# Patient Record
Sex: FEMALE | Race: WHITE | NOT HISPANIC OR LATINO | URBAN - METROPOLITAN AREA
[De-identification: names, ages, dates, MRNs, and addresses within clinical notes are randomized per-mention and may not be internally consistent; named-entity substitution may affect disease eponyms.]

---

## 2023-03-16 LAB
ERYTHROCYTE DISTRIBUTION WIDTH (RATIO) BY AUTOMATED COUNT: 17.7 % (ref 11.5–14.5)
ERYTHROCYTE MEAN CORPUSCULAR HEMOGLOBIN CONCENTRATION (G/DL) BY AUTOMATED: 30.1 G/DL (ref 32–36)
ERYTHROCYTE MEAN CORPUSCULAR VOLUME (FL) BY AUTOMATED COUNT: 81 FL (ref 80–100)
ERYTHROCYTES (10*6/UL) IN BLOOD BY AUTOMATED COUNT: 4.11 X10E12/L (ref 4–5.2)
HEMATOCRIT (%) IN BLOOD BY AUTOMATED COUNT: 33.2 % (ref 36–46)
HEMOGLOBIN (G/DL) IN BLOOD: 10 G/DL (ref 12–16)
LEUKOCYTES (10*3/UL) IN BLOOD BY AUTOMATED COUNT: 7.8 X10E9/L (ref 4.4–11.3)
NRBC (PER 100 WBCS) BY AUTOMATED COUNT: 0 /100 WBC (ref 0–0)
PLATELETS (10*3/UL) IN BLOOD AUTOMATED COUNT: 362 X10E9/L (ref 150–450)

## 2023-05-30 ENCOUNTER — HOSPITAL ENCOUNTER (OUTPATIENT)
Dept: DATA CONVERSION | Facility: HOSPITAL | Age: 38
End: 2023-05-30
Attending: OBSTETRICS & GYNECOLOGY | Admitting: OBSTETRICS & GYNECOLOGY

## 2023-05-30 DIAGNOSIS — N94.6 DYSMENORRHEA, UNSPECIFIED: ICD-10-CM

## 2023-05-30 DIAGNOSIS — D21.9 BENIGN NEOPLASM OF CONNECTIVE AND OTHER SOFT TISSUE, UNSPECIFIED: ICD-10-CM

## 2023-05-30 DIAGNOSIS — N83.8 OTHER NONINFLAMMATORY DISORDERS OF OVARY, FALLOPIAN TUBE AND BROAD LIGAMENT: ICD-10-CM

## 2023-05-30 DIAGNOSIS — E66.01 MORBID (SEVERE) OBESITY DUE TO EXCESS CALORIES (MULTI): ICD-10-CM

## 2023-05-30 DIAGNOSIS — D64.9 ANEMIA, UNSPECIFIED: ICD-10-CM

## 2023-05-30 DIAGNOSIS — N72 INFLAMMATORY DISEASE OF CERVIX UTERI: ICD-10-CM

## 2023-05-30 DIAGNOSIS — F12.10 CANNABIS ABUSE, UNCOMPLICATED: ICD-10-CM

## 2023-05-30 DIAGNOSIS — F17.200 NICOTINE DEPENDENCE, UNSPECIFIED, UNCOMPLICATED: ICD-10-CM

## 2023-05-30 DIAGNOSIS — N92.0 EXCESSIVE AND FREQUENT MENSTRUATION WITH REGULAR CYCLE: ICD-10-CM

## 2023-05-30 DIAGNOSIS — D25.9 LEIOMYOMA OF UTERUS, UNSPECIFIED: ICD-10-CM

## 2023-05-30 DIAGNOSIS — I10 ESSENTIAL (PRIMARY) HYPERTENSION: ICD-10-CM

## 2023-05-30 DIAGNOSIS — Z90.89 ACQUIRED ABSENCE OF OTHER ORGANS: ICD-10-CM

## 2023-05-30 LAB
ABO GROUP (TYPE) IN BLOOD: NORMAL
ANION GAP IN SER/PLAS: 11 MMOL/L (ref 10–20)
ANTIBODY SCREEN: NORMAL
ATRIAL RATE: 68 BPM
CARBON DIOXIDE, TOTAL (MMOL/L) IN SER/PLAS: 24 MMOL/L (ref 21–32)
CHLORIDE (MMOL/L) IN SER/PLAS: 106 MMOL/L (ref 98–107)
ERYTHROCYTE DISTRIBUTION WIDTH (RATIO) BY AUTOMATED COUNT: 17 % (ref 11.5–14.5)
ERYTHROCYTE MEAN CORPUSCULAR HEMOGLOBIN CONCENTRATION (G/DL) BY AUTOMATED: 31.8 G/DL (ref 32–36)
ERYTHROCYTE MEAN CORPUSCULAR VOLUME (FL) BY AUTOMATED COUNT: 86 FL (ref 80–100)
ERYTHROCYTES (10*6/UL) IN BLOOD BY AUTOMATED COUNT: 5.32 X10E12/L (ref 4–5.2)
HEMATOCRIT (%) IN BLOOD BY AUTOMATED COUNT: 45.9 % (ref 36–46)
HEMOGLOBIN (G/DL) IN BLOOD: 14.6 G/DL (ref 12–16)
LEUKOCYTES (10*3/UL) IN BLOOD BY AUTOMATED COUNT: 6.8 X10E9/L (ref 4.4–11.3)
P AXIS: 12 DEGREES
PLATELETS (10*3/UL) IN BLOOD AUTOMATED COUNT: 289 X10E9/L (ref 150–450)
POTASSIUM (MMOL/L) IN SER/PLAS: 3.8 MMOL/L (ref 3.5–5.3)
PR INTERVAL: 165 MS
Q ONSET: 254 MS
QRS COUNT: 11 BEATS
QRS DURATION: 88 MS
QT INTERVAL: 391 MS
QTC CALCULATION(BAZETT): 416 MS
QTC FREDERICIA: 407 MS
R AXIS: -9 DEGREES
RH FACTOR: NORMAL
SODIUM (MMOL/L) IN SER/PLAS: 137 MMOL/L (ref 136–145)
T AXIS: 11 DEGREES
T OFFSET: 449 MS
VENTRICULAR RATE: 68 BPM

## 2023-07-10 LAB
COMPLETE PATHOLOGY REPORT: NORMAL
CONVERTED CLINICAL DIAGNOSIS-HISTORY: NORMAL
CONVERTED FINAL DIAGNOSIS: NORMAL
CONVERTED FINAL REPORT PDF LINK TO COPY AND PASTE: NORMAL
CONVERTED GROSS DESCRIPTION: NORMAL

## 2023-09-07 VITALS
SYSTOLIC BLOOD PRESSURE: 156 MMHG | HEIGHT: 66 IN | RESPIRATION RATE: 14 BRPM | DIASTOLIC BLOOD PRESSURE: 95 MMHG | BODY MASS INDEX: 39.82 KG/M2 | HEART RATE: 72 BPM | TEMPERATURE: 97.7 F

## 2023-09-30 NOTE — H&P
History of Present Illness:   Pregnant/Lactating:  ·  Are You Pregnant no   ·  Are You Currently Breastfeeding no     History Present Illness:  Reason for surgery: 37-year-old with history of fibroids,  menorrhagia, and dysmenorrhea for a total laparoscopic hysterectomy with bilateral salpingectomy   HPI:    37-year-old with history of menorrhagia, dysmenorrhea, and uterine fibroids, and history of anemia for a total laparoscopic hysterectomy with bilateral salpingectomy    Allergies:        Allergies:  ·  No Known Allergies :     Home Medication Review:   Home Medications Reviewed: yes     Impression/Procedure:   ·  Impression and Planned Procedure: Menorrhagia, dysmenorrhea, and fibroids for a total laparoscopic hysterectomy with bilateral salpingectomy       ERAS (Enhanced Recovery After Surgery):  ·  ERAS Patient: yes   ·  CPM/PAT Utilization: yes   ·  Immunonutrition Recovery Drink Utilization: no   ·  Carbohydrate Supplement Drink Utilization: no       Vital Signs:  Temperature C: 36.5 degrees C   Temperature F: 97.7 degrees F   Heart Rate: 72 beats per minute   Respiratory Rate: 14 breath per minute   Blood Pressure Systolic: 156 mm/Hg   Blood Pressure Diastolic: 95 mm/Hg     Physical Exam by System:    Constitutional: Patient sitting comfortably in bed   Respiratory/Thorax: Clear to auscultation   Cardiovascular: Regular rate and rhythm     Consent:   COVID-19 Consent:  ·  COVID-19 Risk Consent Surgeon has reviewed key risks related to the risk of narciso COVID-19 and if they contract COVID-19 what the risks are.       Electronic Signatures:  Santhosh Carmona)  (Signed 30-May-2023 07:26)   Authored: History of Present Illness, Allergies, Home  Medication Review, Impression/Procedure, ERAS, Physical Exam, Consent, Note Completion      Last Updated: 30-May-2023 07:26 by Santhosh Carmona)

## 2023-10-02 NOTE — OP NOTE
PROCEDURE DETAILS    Preoperative Diagnosis:  Benign connective tissue neoplasm, D21.9  Excessive and frequent menstruation, N92.0  Dysmenorrhea    Postoperative Diagnosis:  Fibroids, menorrhagia, dysmenorrhea  Surgeon: Santhosh Carmona  Resident/Fellow/Other Assistant: Dr Winn    Procedure:  1.  Total laparoscopic hysterectomy, bilateral salpingectomy **DR WINN TO ASSIST**    Anesthesia: No anesthesiologist associated with this case  Estimated Blood Loss: 15 mL  Findings: Enlarged uterus with multiple fibroids, partial tubes present and removed, normal ovaries  Specimens(s) Collected: yes,  Uterus and both tubes   Urine Output: 200 mL        Operative Report:   37-year-old with history of menorrhagia, dysmenorrhea, and uterine fibroids.  Patient also with anemia on iron.  Patient admitted for a TLH with bilateral salpingectomy.    Patient was brought to the operating room where a huddle was performed. Patient was placed under general ET tube anesthesia. Patient was placed in the lithotomy position. Patient was prepped and draped in the usual fashion for a vaginal and abdominal  procedure. A timeout was performed. A weighted speculum was inserted in the vagina, Sheehan catheter was inserted and cervix was grasped with a toothed tenaculum. A Cora 2 manipulator is inserted with a 3.5 centimeter cone and a 8 centimeter tip. The intrauterine  balloon was instilled with saline and the vaginal occluder was instilled with air.  Attention was then turned abdominally where a 5 mm umbilical incision was incised in the skin and through this a Veress needle was inserted. CO2 was instilled into the  abdominal cavity. The Veress needle was removed and under direct visualization a trocar and sheath were inserted. A second 10 mm left lateral incision was cut sharply in the skin and a second trocar and sheath were inserted. A third 5 mm right lateral  incision was made in the skin and a third trocar and sheath was  inserted. The pelvis was inspected with the uterus enlarged with multiple fibroids.  Both ovaries appeared normal.  Remnants of both tubes were present and removed.  The upper abdomen appeared  normal..  Both ureters were noted to be peristalsing normally. The left mesosalpinx was incised excising the left tube, followed by the left ovarian ligament and round ligament  with the ligature device. The serosal reflection of the bladder on the uterus  was then incised. The bladder was pushed down off the lower uterine segment. The left uterine vessels were then cauterized and incised. The same procedure was performed on the right side incising the right mesosalpinx excising the right tube followed  by the right ovarian ligament and round ligament followed by the right uterine vessels. Using unipolar cautery the vaginal cuff was incised onto the Cora cup, this was performed anteriorly down along the left side posteriorly and up along the right side.  The uterus was then removed vaginally. The uterus was replaced into the vagina to maintain the pneumoperitoneum. Attention was then turned abdominally where a fourth 5 mm left lateral incision was sharply incised in the skin and a fourth trocar and sheath  was inserted. Hemostasis was obtained from the vaginal cuff with cautery. The vaginal cuff was then closed with two running V lock O absorbable suture. This was run from left to right and back to the left.  hemostasis was noted be good.  The ureters were  again noted be peristalsing normally. All instruments removed. Incisions were repaired with 4-0 Vicryl. All counts were correct at the end of the procedure. EBL was 15 mL's. Patient was taken recovery room in stable condition.    Dr. Winn assisted with the surgery due to the complexity of the case.  She inserted the laparoscope while I inserted the Cora vaginally.  She  managed the laparoscope, assisted with the dissection and manipulation of the uterine fibroids,  ligated the right ovarian ligament, round ligament, and uterine vessels, and incision of the vaginal cuff onto the Cora cup due to the uterine fibroids and  assisted with suturing.  Her assistance was necessary for completion of this case.                        Attestation:   Note Completion:  Attending Attestation I performed the procedure without a resident         Electronic Signatures:  Santhosh Carmona)  (Signed 30-May-2023 09:27)   Authored: Post-Operative Note, Chart Review, Note Completion      Last Updated: 30-May-2023 09:27 by Santhosh Carmona)

## 2024-03-25 ENCOUNTER — HOSPITAL ENCOUNTER (OUTPATIENT)
Dept: RADIOLOGY | Facility: EXTERNAL LOCATION | Age: 39
Discharge: HOME | End: 2024-03-25

## 2024-03-25 DIAGNOSIS — M54.50 LOW BACK PAIN, UNSPECIFIED BACK PAIN LATERALITY, UNSPECIFIED CHRONICITY, UNSPECIFIED WHETHER SCIATICA PRESENT: ICD-10-CM

## 2024-06-12 PROBLEM — Z01.419 WELL WOMAN EXAM: Status: ACTIVE | Noted: 2024-06-12

## 2024-06-19 ENCOUNTER — APPOINTMENT (OUTPATIENT)
Dept: OBSTETRICS AND GYNECOLOGY | Facility: CLINIC | Age: 39
End: 2024-06-19
Payer: COMMERCIAL

## 2024-06-19 VITALS
WEIGHT: 239 LBS | BODY MASS INDEX: 38.41 KG/M2 | SYSTOLIC BLOOD PRESSURE: 134 MMHG | HEIGHT: 66 IN | DIASTOLIC BLOOD PRESSURE: 86 MMHG

## 2024-06-19 DIAGNOSIS — Z01.419 WELL WOMAN EXAM: Primary | ICD-10-CM

## 2024-06-19 DIAGNOSIS — Z12.31 BREAST CANCER SCREENING BY MAMMOGRAM: ICD-10-CM

## 2024-06-19 PROCEDURE — 99396 PREV VISIT EST AGE 40-64: CPT | Performed by: OBSTETRICS & GYNECOLOGY

## 2024-06-19 PROCEDURE — 4004F PT TOBACCO SCREEN RCVD TLK: CPT | Performed by: OBSTETRICS & GYNECOLOGY

## 2024-06-19 RX ORDER — LISINOPRIL 20 MG/1
20 TABLET ORAL DAILY
COMMUNITY

## 2024-06-19 RX ORDER — ERGOCALCIFEROL 1.25 MG/1
CAPSULE ORAL
COMMUNITY
Start: 2022-09-01

## 2024-06-19 RX ORDER — FAMOTIDINE 20 MG/1
20 TABLET, FILM COATED ORAL 2 TIMES DAILY
COMMUNITY

## 2024-06-19 RX ORDER — HYDROXYZINE PAMOATE 25 MG/1
CAPSULE ORAL EVERY 6 HOURS PRN
COMMUNITY

## 2024-06-19 NOTE — PROGRESS NOTES
Subjective   Patient ID: Claudia Heard is a 38 y.o. female who presents for Annual Exam (Denies problems, needs new mammogram order.).  HPI  38-year-old here for her annual physical exam.  Patient doing well with no concerns.  Patient had a hysterectomy last year.        Objective   Physical Exam  Exam conducted with a chaperone present.   Constitutional:       Appearance: Normal appearance.   Cardiovascular:      Rate and Rhythm: Normal rate and regular rhythm.   Pulmonary:      Effort: Pulmonary effort is normal.      Breath sounds: Normal breath sounds.   Chest:   Breasts:     Right: Normal. No mass or tenderness.      Left: Normal. No mass or tenderness.   Abdominal:      Palpations: Abdomen is soft. There is no mass.      Tenderness: There is no abdominal tenderness.   Genitourinary:     General: Normal vulva.      Vagina: Normal. No lesions.      Uterus: Absent.       Adnexa: Right adnexa normal and left adnexa normal.        Right: No mass or tenderness.          Left: No mass or tenderness.     Musculoskeletal:      Cervical back: Neck supple.   Skin:     General: Skin is warm and dry.   Neurological:      Mental Status: She is alert and oriented to person, place, and time.   Psychiatric:         Mood and Affect: Mood normal.         Behavior: Behavior normal.         Assessment/Plan   Problem List Items Addressed This Visit             ICD-10-CM    Well woman exam - Primary Z01.419     --ANNUAL EXAM: Patient doing well with no concerns.  Normal exam.  Will obtain routine ultrasound and follow-up in 1 year.  --Status post HYSTERECTOMY: In May 2023  --Patient is P2    PLAN:  Mammogram  Follow-up in 1-2  year          Other Visit Diagnoses         Codes    Breast cancer screening by mammogram     Z12.31    Relevant Orders    BI mammo bilateral screening tomosynthesis

## 2024-06-19 NOTE — ASSESSMENT & PLAN NOTE
--ANNUAL EXAM: Patient doing well with no concerns.  Normal exam.  Will obtain routine ultrasound and follow-up in 1 year.  --Status post HYSTERECTOMY: In May 2023  --Patient is P2    PLAN:  Mammogram  Follow-up in 1-2  year

## 2024-07-03 ENCOUNTER — HOSPITAL ENCOUNTER (OUTPATIENT)
Dept: RADIOLOGY | Facility: CLINIC | Age: 39
Discharge: HOME | End: 2024-07-03
Payer: COMMERCIAL

## 2024-07-03 VITALS — WEIGHT: 245 LBS | HEIGHT: 66 IN | BODY MASS INDEX: 39.37 KG/M2

## 2024-07-03 DIAGNOSIS — Z12.31 BREAST CANCER SCREENING BY MAMMOGRAM: ICD-10-CM

## 2024-07-03 PROCEDURE — 77067 SCR MAMMO BI INCL CAD: CPT | Performed by: RADIOLOGY

## 2024-07-03 PROCEDURE — 77063 BREAST TOMOSYNTHESIS BI: CPT | Performed by: RADIOLOGY

## 2024-07-03 PROCEDURE — 77067 SCR MAMMO BI INCL CAD: CPT

## 2025-05-17 ENCOUNTER — HOSPITAL ENCOUNTER (OUTPATIENT)
Dept: RADIOLOGY | Facility: CLINIC | Age: 40
Discharge: HOME | End: 2025-05-17
Payer: COMMERCIAL

## 2025-05-17 DIAGNOSIS — Z82.49 FAMILY HISTORY OF ISCHEMIC HEART DISEASE AND OTHER DISEASES OF THE CIRCULATORY SYSTEM: ICD-10-CM

## 2025-05-17 DIAGNOSIS — I10 ESSENTIAL (PRIMARY) HYPERTENSION: ICD-10-CM

## 2025-05-17 PROCEDURE — 75571 CT HRT W/O DYE W/CA TEST: CPT
